# Patient Record
Sex: FEMALE | Race: WHITE
[De-identification: names, ages, dates, MRNs, and addresses within clinical notes are randomized per-mention and may not be internally consistent; named-entity substitution may affect disease eponyms.]

---

## 2018-06-28 ENCOUNTER — HOSPITAL ENCOUNTER (EMERGENCY)
Dept: HOSPITAL 9 - MADERS | Age: 43
Discharge: HOME | End: 2018-06-28
Payer: SELF-PAY

## 2018-06-28 DIAGNOSIS — F17.210: ICD-10-CM

## 2018-06-28 DIAGNOSIS — N39.0: Primary | ICD-10-CM

## 2018-06-28 DIAGNOSIS — J45.909: ICD-10-CM

## 2018-06-28 DIAGNOSIS — B20: ICD-10-CM

## 2018-06-28 LAB
BACTERIA UR QL AUTO: (no result) HPF
GLUCOSE UR STRIP-MCNC: 250 MG/DL
PREGU CONTROL BACKGROUND?: (no result)
PREGU CONTROL BAR APPEAR?: YES
PROT UR STRIP.AUTO-MCNC: (no result) MG/DL
RBC UR QL AUTO: (no result) HPF (ref 0–3)
SP GR UR STRIP: 1.03 (ref 1–1.04)

## 2018-06-28 PROCEDURE — 81003 URINALYSIS AUTO W/O SCOPE: CPT

## 2018-06-28 PROCEDURE — 99283 EMERGENCY DEPT VISIT LOW MDM: CPT

## 2018-06-28 PROCEDURE — 81015 MICROSCOPIC EXAM OF URINE: CPT

## 2018-06-28 PROCEDURE — 81025 URINE PREGNANCY TEST: CPT

## 2018-07-10 ENCOUNTER — HOSPITAL ENCOUNTER (EMERGENCY)
Dept: HOSPITAL 92 - ERS | Age: 43
LOS: 1 days | Discharge: HOME | End: 2018-07-11
Payer: SELF-PAY

## 2018-07-10 DIAGNOSIS — E11.65: Primary | ICD-10-CM

## 2018-07-10 DIAGNOSIS — F17.210: ICD-10-CM

## 2018-07-10 DIAGNOSIS — J45.909: ICD-10-CM

## 2018-07-10 LAB
ALBUMIN SERPL BCG-MCNC: 3.5 G/DL (ref 3.5–5)
ALP SERPL-CCNC: 534 U/L (ref 40–150)
ALT SERPL W P-5'-P-CCNC: 83 U/L (ref 8–55)
ANION GAP SERPL CALC-SCNC: 15 MMOL/L (ref 10–20)
AST SERPL-CCNC: 32 U/L (ref 5–34)
BACTERIA UR QL AUTO: (no result) HPF
BASOPHILS # BLD AUTO: 0.1 THOU/UL (ref 0–0.2)
BASOPHILS NFR BLD AUTO: 1.4 % (ref 0–1)
BICARBONATE (HCO3V): 31.1 MMOL/L (ref 1–85)
BILIRUB SERPL-MCNC: 2 MG/DL (ref 0.2–1.2)
BUN SERPL-MCNC: 11 MG/DL (ref 7–18.7)
CA-I BLD-SCNC: 1.23 MMOL/L (ref 1.12–1.32)
CALCIUM SERPL-MCNC: 10.6 MG/DL (ref 7.8–10.44)
CHLORIDE SERPL-SCNC: 92 MMOL/L (ref 98–107)
CHLORIDE SERPL-SCNC: 96 MMOL/L (ref 98–113)
CO2 BLDV CALC-SCNC: 32.5 MMOL/L (ref 1–85)
CO2 SERPL-SCNC: 25 MMOL/L (ref 22–29)
CO2 TENSION (PVCO2): 46.8 MMHG (ref 41–51)
CREAT CL PREDICTED SERPL C-G-VRATE: 0 ML/MIN (ref 70–130)
CRYSTAL-AUWI FLAG: 0 (ref 0–15)
EOSINOPHIL # BLD AUTO: 0.1 THOU/UL (ref 0–0.7)
EOSINOPHIL NFR BLD AUTO: 1.2 % (ref 0–10)
GLOBULIN SER CALC-MCNC: 4.2 G/DL (ref 2.4–3.5)
GLUCOSE SERPL-MCNC: 601 MG/DL (ref 70–105)
GLUCOSE UR STRIP-MCNC: >=1000 MG/DL
HCT VFR BLD CALC: 49 % (ref 36–52)
HEMOGLOBIN - CALC: 16.6 G/DL (ref 12–18)
HEV IGM SER QL: 1.1 (ref 0–7.99)
HGB BLD-MCNC: 14.3 G/DL (ref 12–16)
HYALINE CASTS #/AREA URNS LPF: (no result) LPF
LYMPHOCYTES # BLD: 2.6 THOU/UL (ref 1.2–3.4)
LYMPHOCYTES NFR BLD AUTO: 24.7 % (ref 21–51)
MAGNESIUM SERPL-MCNC: 1.8 MG/DL (ref 1.6–2.6)
MCH RBC QN AUTO: 27.4 PG (ref 27–31)
MCV RBC AUTO: 83.7 FL (ref 78–98)
MONOCYTES # BLD AUTO: 0.7 THOU/UL (ref 0.11–0.59)
MONOCYTES NFR BLD AUTO: 7 % (ref 0–10)
NEUTROPHILS # BLD AUTO: 6.9 THOU/UL (ref 1.4–6.5)
NEUTROPHILS NFR BLD AUTO: 65.7 % (ref 42–75)
O2 TENSION (PVO2): 22.1 MMHG (ref 35–45)
PATHC CAST-AUWI FLAG: 0.29 (ref 0–2.49)
PLATELET # BLD AUTO: 305 THOU/UL (ref 130–400)
POTASSIUM SERPL-SCNC: 3.8 MMOL/L (ref 3.4–4.7)
POTASSIUM SERPL-SCNC: 4.8 MMOL/L (ref 3.5–5.1)
PREGS CONTROL BACKGROUND?: (no result)
PREGS CONTROL BAR APPEAR?: YES
RBC # BLD AUTO: 5.23 MILL/UL (ref 4.2–5.4)
RBC UR QL AUTO: (no result) HPF (ref 0–3)
SAO2 % BLDV FROM PO2: 38.5 % (ref 94–98)
SODIUM SERPL-SCNC: 127 MMOL/L (ref 136–145)
SODIUM SERPL-SCNC: 133 MMOL/L (ref 138–145)
SP GR UR STRIP: 1.04 (ref 1–1.04)
SPERM-AUWI FLAG: 0 (ref 0–9.9)
WBC # BLD AUTO: 10.5 THOU/UL (ref 4.8–10.8)
WBC UR QL AUTO: (no result) HPF (ref 0–3)
YEAST-AUWI FLAG: 28 (ref 0–25)

## 2018-07-10 PROCEDURE — 84100 ASSAY OF PHOSPHORUS: CPT

## 2018-07-10 PROCEDURE — 84703 CHORIONIC GONADOTROPIN ASSAY: CPT

## 2018-07-10 PROCEDURE — 82330 ASSAY OF CALCIUM: CPT

## 2018-07-10 PROCEDURE — 82010 KETONE BODYS QUAN: CPT

## 2018-07-10 PROCEDURE — 82803 BLOOD GASES ANY COMBINATION: CPT

## 2018-07-10 PROCEDURE — 96374 THER/PROPH/DIAG INJ IV PUSH: CPT

## 2018-07-10 PROCEDURE — 36416 COLLJ CAPILLARY BLOOD SPEC: CPT

## 2018-07-10 PROCEDURE — 81015 MICROSCOPIC EXAM OF URINE: CPT

## 2018-07-10 PROCEDURE — 36415 COLL VENOUS BLD VENIPUNCTURE: CPT

## 2018-07-10 PROCEDURE — 83735 ASSAY OF MAGNESIUM: CPT

## 2018-07-10 PROCEDURE — 96361 HYDRATE IV INFUSION ADD-ON: CPT

## 2018-07-10 PROCEDURE — 80053 COMPREHEN METABOLIC PANEL: CPT

## 2018-07-10 PROCEDURE — 81003 URINALYSIS AUTO W/O SCOPE: CPT

## 2018-07-10 PROCEDURE — 83690 ASSAY OF LIPASE: CPT

## 2018-07-10 PROCEDURE — 96372 THER/PROPH/DIAG INJ SC/IM: CPT

## 2018-07-10 PROCEDURE — 85025 COMPLETE CBC W/AUTO DIFF WBC: CPT

## 2020-09-23 ENCOUNTER — HOSPITAL ENCOUNTER (EMERGENCY)
Dept: HOSPITAL 9 - MADERS | Age: 45
Discharge: HOME | End: 2020-09-23
Payer: SELF-PAY

## 2020-09-23 DIAGNOSIS — S60.221A: Primary | ICD-10-CM

## 2020-09-23 DIAGNOSIS — Z79.84: ICD-10-CM

## 2020-09-23 DIAGNOSIS — F17.210: ICD-10-CM

## 2020-09-23 DIAGNOSIS — S40.011A: ICD-10-CM

## 2020-09-23 DIAGNOSIS — J45.909: ICD-10-CM

## 2020-09-23 DIAGNOSIS — G62.9: ICD-10-CM

## 2020-09-23 DIAGNOSIS — E11.9: ICD-10-CM
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
## 2020-09-23 DIAGNOSIS — Z79.899: ICD-10-CM

## 2020-09-23 PROCEDURE — 90471 IMMUNIZATION ADMIN: CPT

## 2020-09-23 PROCEDURE — 90715 TDAP VACCINE 7 YRS/> IM: CPT

## 2020-09-23 NOTE — RAD
Exam:4 views right elbow



HISTORY: Fall. Pain. Injury.



COMPARISON: None



FINDINGS: Preserved joint spaces. No fracture or malalignment. No joint effusion.



IMPRESSION: No posttraumatic change.



Reported By: Brendan Lay 

Electronically Signed:  9/23/2020 2:49 PM

## 2020-09-23 NOTE — RAD
EXAM: 3 views of the right hand



COMPARISON: None



HISTORY: Hand pain after fall



FINDINGS: 3 views of the hand shows no evidence of acute fracture or dislocation. No degenerative deondre
nges are seen. Mild index finger soft tissue swelling is present.



IMPRESSION: No evidence of acute osseous abnormality.



Reported By: Sudheer Baron 

Electronically Signed:  9/23/2020 2:50 PM

## 2020-09-23 NOTE — RAD
EXAM: 3 views of the right shoulder



HISTORY: Shoulder pain after fall



COMPARISON: None



FINDINGS: There is no evidence of acute fracture or dislocation. No degenerative changes are present.
 No soft tissue swelling is seen. The visualized thorax is unremarkable.



IMPRESSION:

No evidence of acute osseous abnormality.



Reported By: Sudheer Baron 

Electronically Signed:  9/23/2020 2:48 PM